# Patient Record
Sex: FEMALE | HISPANIC OR LATINO | URBAN - NONMETROPOLITAN AREA
[De-identification: names, ages, dates, MRNs, and addresses within clinical notes are randomized per-mention and may not be internally consistent; named-entity substitution may affect disease eponyms.]

---

## 2017-04-16 ENCOUNTER — OFFICE VISIT (OUTPATIENT)
Dept: URGENT CARE | Facility: PHYSICIAN GROUP | Age: 57
End: 2017-04-16
Payer: COMMERCIAL

## 2017-04-16 VITALS
TEMPERATURE: 98.7 F | DIASTOLIC BLOOD PRESSURE: 68 MMHG | SYSTOLIC BLOOD PRESSURE: 138 MMHG | RESPIRATION RATE: 20 BRPM | BODY MASS INDEX: 26.97 KG/M2 | HEART RATE: 91 BPM | WEIGHT: 167 LBS | OXYGEN SATURATION: 93 %

## 2017-04-16 DIAGNOSIS — J11.1 INFLUENZA-LIKE ILLNESS: Primary | ICD-10-CM

## 2017-04-16 DIAGNOSIS — J98.01 BRONCHOSPASM, ACUTE: ICD-10-CM

## 2017-04-16 DIAGNOSIS — R05.9 COUGH: ICD-10-CM

## 2017-04-16 LAB
FLUAV+FLUBV AG SPEC QL IA: NORMAL
INT CON NEG: NEGATIVE
INT CON POS: POSITIVE

## 2017-04-16 PROCEDURE — 87804 INFLUENZA ASSAY W/OPTIC: CPT | Performed by: PHYSICIAN ASSISTANT

## 2017-04-16 PROCEDURE — 94640 AIRWAY INHALATION TREATMENT: CPT | Performed by: PHYSICIAN ASSISTANT

## 2017-04-16 PROCEDURE — 99214 OFFICE O/P EST MOD 30 MIN: CPT | Mod: 25 | Performed by: PHYSICIAN ASSISTANT

## 2017-04-16 RX ORDER — IPRATROPIUM BROMIDE AND ALBUTEROL SULFATE 2.5; .5 MG/3ML; MG/3ML
3 SOLUTION RESPIRATORY (INHALATION) ONCE
Status: COMPLETED | OUTPATIENT
Start: 2017-04-16 | End: 2017-04-16

## 2017-04-16 RX ORDER — ALBUTEROL SULFATE 90 UG/1
2 AEROSOL, METERED RESPIRATORY (INHALATION) EVERY 4 HOURS PRN
Qty: 1 INHALER | Refills: 3 | Status: SHIPPED | OUTPATIENT
Start: 2017-04-16 | End: 2020-07-07

## 2017-04-16 RX ORDER — OSELTAMIVIR PHOSPHATE 75 MG/1
75 CAPSULE ORAL 2 TIMES DAILY
Qty: 10 CAP | Refills: 0 | Status: SHIPPED | OUTPATIENT
Start: 2017-04-16 | End: 2020-07-07

## 2017-04-16 RX ADMIN — IPRATROPIUM BROMIDE AND ALBUTEROL SULFATE 3 ML: 2.5; .5 SOLUTION RESPIRATORY (INHALATION) at 14:09

## 2017-04-16 ASSESSMENT — ENCOUNTER SYMPTOMS: BODY ACHES: 1

## 2017-04-16 NOTE — PROGRESS NOTES
Subjective:      Chichi Ellis is a 56 y.o. female who presents with Generalized Body Aches    PMH:  has a past medical history of GERD (gastroesophageal reflux disease); Diabetes; Hypertension; Hyperlipidemia; Anxiety; and Arthritis.  MEDS:   Current outpatient prescriptions:   •  sitagliptin (JANUVIA) 100 MG Tab, Take 100 mg by mouth every day., Disp: , Rfl:   •  ciprofloxacin (CIPRO) 500 MG Tab, Take 1 Tab by mouth 2 times a day., Disp: 20 Tab, Rfl: 0  •  fluconazole (DIFLUCAN) 150 MG tablet, Take 1 tab now and one in 72 hours if not resolved., Disp: 2 Tab, Rfl: 1  •  glipiZIDE (GLUCOTROL) 5 MG TABS, Take 10 mg by mouth 2 times a day., Disp: , Rfl:   •  metformin (GLUCOPHAGE) 500 MG TABS, Take 1,000 mg by mouth 2 times a day, with meals., Disp: , Rfl:   •  aspirin (ASA) 81 MG CHEW chewable tablet, Take 81 mg by mouth every day., Disp: , Rfl:   •  insulin detemir (LEVEMIR FLEXPEN) 100 UNIT/ML SOLN, 80 units bid, Disp: , Rfl:   •  insulin aspart (NOVOLOG) 100 UNIT/ML SOPN injection, Inject 5 Units as instructed 3 times a day before meals., Disp: 3 PEN, Rfl: 0  •  carvedilol (COREG) 25 MG TABS, TAKE ONE TABLET BY MOUTH TWICE DAILY WITH  MEALS, Disp: 180 Each, Rfl: 2  •  pantoprazole (PROTONIX) 40 MG TBEC, Take 1 Tab by mouth every day., Disp: 90 Each, Rfl: 3  •  sertraline (ZOLOFT) 50 MG TABS, Take 1 Tab by mouth every day. (Patient taking differently: Take 100 mg by mouth every day.), Disp: 30 Tab, Rfl: 11  ALLERGIES:   Allergies   Allergen Reactions   • Vicodin [Hydrocodone-Acetaminophen] Vomiting     SURGHX:   Past Surgical History   Procedure Laterality Date   • Abdominal hysterectomy total     • Primary c section     • Cholecystectomy       SOCHX:  reports that she has quit smoking. She does not have any smokeless tobacco history on file. She reports that she does not drink alcohol or use illicit drugs.  FH:  Reviewed with patient/family. Not pertinent to this complaint.            Generalized Body Aches  This  is a new problem. The current episode started yesterday. The problem occurs constantly. The problem has been rapidly worsening. Associated symptoms include arthralgias, chills, congestion, coughing, a fever and myalgias. Pertinent negatives include no chest pain, headaches, nausea, rash, sore throat or vomiting. The symptoms are aggravated by exertion. She has tried acetaminophen and rest (dayquil /nyquil) for the symptoms. The treatment provided no relief.       Review of Systems   Constitutional: Positive for fever and chills.   HENT: Positive for congestion. Negative for sore throat.    Eyes: Negative for discharge.   Respiratory: Positive for cough and sputum production. Negative for shortness of breath.    Cardiovascular: Negative for chest pain.   Gastrointestinal: Negative for nausea, vomiting and diarrhea.   Musculoskeletal: Positive for myalgias and arthralgias.   Skin: Negative for rash.   Neurological: Negative for dizziness and headaches.   All other systems reviewed and are negative.         Objective:     /68 mmHg  Pulse 91  Temp(Src) 37.1 °C (98.7 °F)  Resp 20  Wt 75.751 kg (167 lb)  SpO2 93%     Physical Exam   Constitutional: She is oriented to person, place, and time. She appears well-developed and well-nourished. She appears ill. No distress.   HENT:   Head: Normocephalic.   Right Ear: Tympanic membrane normal.   Left Ear: Tympanic membrane normal.   Nose: Rhinorrhea present.   Mouth/Throat: Uvula is midline and mucous membranes are normal. Posterior oropharyngeal erythema present.   Eyes: EOM are normal. Pupils are equal, round, and reactive to light.   Neck: Normal range of motion. Neck supple.   Cardiovascular: Normal rate, regular rhythm and normal heart sounds.    Pulmonary/Chest: Effort normal. No respiratory distress. She has wheezes. She has rhonchi. She has no rales.   Abdominal: Soft.   Musculoskeletal: Normal range of motion.   Neurological: She is alert and oriented to  person, place, and time.   Skin: Skin is warm and dry.               Assessment/Plan:     1. Influenza-like illness  POCT Influenza A/B    ipratropium-albuterol (DUONEB) nebulizer solution 3 mL    oseltamivir (TAMIFLU) 75 MG Cap    albuterol 108 (90 BASE) MCG/ACT Aero Soln inhalation aerosol   2. Cough  POCT Influenza A/B    ipratropium-albuterol (DUONEB) nebulizer solution 3 mL    oseltamivir (TAMIFLU) 75 MG Cap    albuterol 108 (90 BASE) MCG/ACT Aero Soln inhalation aerosol   3. Bronchospasm, acute  POCT Influenza A/B    ipratropium-albuterol (DUONEB) nebulizer solution 3 mL    oseltamivir (TAMIFLU) 75 MG Cap    albuterol 108 (90 BASE) MCG/ACT Aero Soln inhalation aerosol     Discussed that I felt this was viral in nature. Did not see any evidence of a bacterial process. Discussed natural progression and sx care.    PT can continue OTC medications, increase fluids and rest until symptoms improve.     PT should follow up with PCP in 1-2 days for re-evaluation if symptoms have not improved.  Discussed red flags and reasons to return to UC or ED.  Pt and/or family verbalized understanding of diagnosis and follow up instructions and was given informational handout on diagnosis.  PT discharged.

## 2017-04-16 NOTE — MR AVS SNAPSHOT
Chichi Ellis   2017 1:10 PM   Office Visit   MRN: [id number]    Department:  [facility]   Dept Phone:  [phone]    Description:  Female : 1960   Provider:  Zully Lee PA-C           Reason for Visit     Generalized Body Aches           Allergies as of 2017     Allergen Noted Reactions    Vicodin [Hydrocodone-Acetaminophen] 04/10/2013   Vomiting      You were diagnosed with     Influenza-like illness   [759297]  -  Primary     Cough   [786.2.ICD-9-CM]       Bronchospasm, acute   [761789]         Vital Signs     Blood Pressure Pulse Temperature Respirations Weight Oxygen Saturation    138/68 mmHg 91 37.1 °C (98.7 °F) 20 75.751 kg (167 lb) 93%    Smoking Status                   Former Smoker           Basic Information     Date Of Birth Sex Race Ethnicity Preferred Language    1960 Female  or   Origin (Thai,Sri Lankan,Palestinian,Ghanaian, etc) English      Problem List              ICD-10-CM Priority Class Noted - Resolved    HTN (hypertension) I10   2012 - Present    Anxiety F41.9   2012 - Present    DMII (diabetes mellitus, type 2) (CMS-HCC) E11.9   10/2/2012 - Present    Abdominal pain    1/3/2013 - Present    GERD (gastroesophageal reflux disease) K21.9 Medium Chronic 2013 - Present    Fatty liver K76.0 Low Chronic 2013 - Present    SOB (shortness of breath) R06.02   4/10/2013 - Present    Foot pain M79.673 Medium Chronic 2013 - Present    Chest pain R07.9   5/10/2015 - Present      Health Maintenance        Date Due Completion Dates    IMM HEP B VACCINE (1 of 3 - Primary Series) 1960 ---    DIABETES MONOFILAMENT / LE EXAM 1960 ---    RETINAL SCREENING 1978 ---    IMM DTaP/Tdap/Td Vaccine (1 - Tdap) 1979 ---    IMM PNEUMOCOCCAL 19-64 (ADULT) MEDIUM RISK SERIES (1 of 1 - PPSV23) 1979 ---    PAP SMEAR 1981 ---    MAMMOGRAM 2000 ---    COLONOSCOPY 2010 ---    A1C SCREENING 2014,  1/28/2014, 5/30/2013, 1/29/2013, 10/26/2012, 9/8/2012    URINE ACR / MICROALBUMIN 1/28/2015 1/28/2014, 9/8/2012    FASTING LIPID PROFILE 6/17/2015 6/17/2014, 1/28/2014, 5/30/2013    SERUM CREATININE 5/9/2016 5/9/2015, 6/17/2014, 1/28/2014, 5/30/2013, 10/26/2012            Results     POCT Influenza A/B      Component    Rapid Influenza A-B    neg    Internal Control Positive    Positive    Internal Control Negative    Negative                        Current Immunizations     Influenza TIV (IM) 10/12/2012      Below and/or attached are the medications your provider expects you to take. Review all of your home medications and newly ordered medications with your provider and/or pharmacist. Follow medication instructions as directed by your provider and/or pharmacist. Please keep your medication list with you and share with your provider. Update the information when medications are discontinued, doses are changed, or new medications (including over-the-counter products) are added; and carry medication information at all times in the event of emergency situations     Allergies:  VICODIN - Vomiting               Medications  Valid as of: April 16, 2017 -  2:25 PM    Generic Name Brand Name Tablet Size Instructions for use    Albuterol Sulfate (Aero Soln) albuterol 108 (90 BASE) MCG/ACT Inhale 2 Puffs by mouth every four hours as needed for Shortness of Breath (bronchospasm).        Aspirin (Chew Tab) ASA 81 MG Take 81 mg by mouth every day.        Carvedilol (Tab) COREG 25 MG TAKE ONE TABLET BY MOUTH TWICE DAILY WITH  MEALS        Ciprofloxacin HCl (Tab) CIPRO 500 MG Take 1 Tab by mouth 2 times a day.        Fluconazole (Tab) DIFLUCAN 150 MG Take 1 tab now and one in 72 hours if not resolved.        GlipiZIDE (Tab) GLUCOTROL 5 MG Take 10 mg by mouth 2 times a day.        Insulin Aspart (Solution Pen-injector) NOVOLOG 100 UNIT/ML Inject 5 Units as instructed 3 times a day before meals.        Insulin Detemir (Solution)  "LEVEMIR 100 UNIT/ML 80 units bid        MetFORMIN HCl (Tab) GLUCOPHAGE 500 MG Take 1,000 mg by mouth 2 times a day, with meals.        Oseltamivir Phosphate (Cap) TAMIFLU 75 MG Take 1 Cap by mouth 2 times a day.        Pantoprazole Sodium (Tablet Delayed Response) PROTONIX 40 MG Take 1 Tab by mouth every day.        Sertraline HCl (Tab) ZOLOFT 50 MG Take 1 Tab by mouth every day.        SITagliptin Phosphate (Tab) JANUVIA 100 MG Take 100 mg by mouth every day.        .                 Medicines prescribed today were sent to:     [address] [place] [address]    Phone: [phone] Fax: [phone]    Open 24 Hours?: No      Medication refill instructions:       If your prescription bottle indicates you have medication refills left, it is not necessary to call your provider’s office. Please contact your pharmacy and they will refill your medication.    If your prescription bottle indicates you do not have any refills left, you may request refills at any time through one of the following ways: The online [employer] system (except Urgent Care), by calling your provider’s office, or by asking your pharmacy to contact your provider’s office with a refill request. Medication refills are processed only during regular business hours and may not be available until the next business day. Your provider may request additional information or to have a follow-up visit with you prior to refilling your medication.   *Please Note: Medication refills are assigned a new Rx number when refilled electronically. Your pharmacy may indicate that no refills were authorized even though a new prescription for the same medication is available at the pharmacy. Please request the medicine by name with the pharmacy before contacting your provider for a refill.        Instructions    Influenza A (H1N1)  (Influenza A [H1N1])  La gripe H1N1, llamada \"gripe porcina\" es producida por un " "nuevo virus de influenza. El virus H1N1 es diferente del virus de la influenza estacional. Las últimas pruebas muestran que el virus H1N1 tiene genes similares a los del virus de la gripe que afecta a los cerdos en [place] y [place]. Los síntomas son similares a los de la gripe estacional y se disemina de persona a persona. Usted puede tener riesgo de sufrir mayores complicaciones si tiene kyle enfermedad crónica subyacente. El CDC y la [employer] [place]acy están controlando los casos informados en todo el manju.  CAUSAS  · La gripe se contagia a través de las gotitas que se eliminan con la tos y los estornudos.  · Kyle persona puede infectarse tocando algo que contenga el virus y luego tocándose la boca o la nariz.  SÍNTOMAS  · Fiebre.  · Dolor de cindy.  · Cansancio.  · Tos.  · Dolor de garganta.  · Congestión nasal o que gotea.  · Elif en el cuerpo.  · También pueden existir diarrea y vómitos.  Estos síntomas se conocen darling \"síntomas característicos de la gripe\". Muchas enfermedades distintas, incluso el resfrío común, pueden tener síntomas similares.  DIAGNÓSTICO  · Existen análisis que permiten diagnosticar si usted tiene el virus H1N1.  · Todos los casos confirmados serán informados al departamento de kacy estatal o local.  · Puede ser necesario que etienne médico le realice pruebas para determinar si tiene otra infección que pueda ser kyle complicación de la gripe.  INSTRUCCIONES PARA EL CUIDADO DOMICILIARIO  · Busque ayuda de inmediato si desarrolla alguno de los siguientes síntomas.  · Si está en riesgo de sufrir complicaciones de la gripe, deberá consultar a un profesional de la kacy cuando comiencen los síntomas. Las personas que tienen un alto riesgo de complicaciones son:  · Mayores de 65 años de edad.  · Pacientes con enfermedades crónicas.  · Mujeres embarazadas.  · Niños pequeños.  · El profesional que lo asiste podrá recomendar la utilización de ciertas drogas antivirales para el tratamiento " de la gripe.  · Si contrae gripe, es aconsejable que descanse lo suficiente, timothy gran cantidad de líquidos y evite el consumo de alcohol y tabaco.  · También puede peter medicamentos de venta cynthia que alivien los síntomas de la gripe, si se lo permite el profesional que lo asiste. (Nunca de aspirina a niños o adolescentes que tienen síntomas de la gripe, particularmente fiebre).  TRATAMIENTO  Si contrae la enfermedad, hay drogas antivirales disponibles. Estos medicamentos pueden hacer que etienne enfermedad sea más leve y a que mejore más rápidamente. El tratamiento debe comenzar poco después del inicio de la enfermedad. Sólo el médico puede prescribir medicamentos antivirales.  PREVENCIÓN  · Cúbrase la boca y la nariz con un tisú o con la mano cuando tosa o estornude. Descarte el tisú.  · Lave derrek lashonda frecuentemente con agua y jabón. Utilice un limpiador a base de alcohol si no dispone de agua.  · Evite tocarse los ojos, la nariz o la boca.  · Trate de evitar el contacto con personas enfermas.  · Si está enfermo, quédese en etienne casa y no concurra al trabajo o a la escuela para evitar el contagio a otras personas. Las personas infectadas con el virus H1N1 pueden infectar a otras desde un día antes de tener los síntomas hasta 5 a 7 días después.  · Hay disponible kyle vacuna que ayuda a la protección contra el virus H1N1. Además de esta vacuna, deberá vacunarse contra la gripe estacional. Estas dos vacunas deben aplicarse el mismo día. El CDC recomienda especialmente la vacuna contra el virus H1N1 en:  · Mujeres embarazadas.  · Personas que viven con niños menores de 6 meses o cuidan de ellos.  · Personal de los servicios de kacy y emergencias.  · Personas entre los 6 meses y los 24 años de edad.  · Personas entre 25 y 64 años que tienen más riesgo si contraen el virus H1N1 por que padecen enfermedades crónicas o porque tienen compromiso de etienne sistema inmunológico.  DARRYL:  En comunidades y hogares no se recomienda  el uso de mascarillas ni barbijos N95. En ciertas circunstancias, pueden utilizarlos las personas que tienen más riesgo de desarrollar olga graves de influenza. Etienne médico le podrá gaye otras tras recomendaciones para el uso de las mascarillas.  SIGNOS DE ALERTA POR TRATARSE DE KYLE EMERGENCIA QUE NECESITAN ATENCIÓN MÉDICA DE URGENCIA TANTO EN NIÑOS :  · Respiración rápida o problemas para respirar.  · Color de piel azulado.  · No guido suficiente cantidad de líquidos.  · No se despierta o no interactúa.  · Está tan irritable que no quiere que se lo cargue.  · Etienne ryan tienen kyle temperatura oral de más de 102° F (38.9° C) y no puede controlarla con medicamentos.  · Etienne bebé tiene más de 3 meses y etienne temperatura rectal es de 102° F (38.9° C) o más.  · Etienne bebé tiene 3 meses o menos y etienne temperatura rectal es de 100.4° F (38° C) o más.  · Fiebre con erupción cutánea.  SIGNOS DE ALERTA POR TRATARSE DE KYLE EMERGENCIA QUE NECESITAN ATENCIÓN MÉDICA DE URGENCIA TANTO EN ADULTOS:  · Le falta la respiración o presenta dificultades respiratorias.  · Dolor o presión en el pecho o abdomen.  · Mareos repentinos.  · Confusión.  · Vómitos intensos y persistentes.  · Está tan irritable que no quiere que se lo cargue.  · Usted kyle temperatura oral de más de 102° F (38.9° C) y no puede controlarla con medicamentos.  · Los síntomas mejoran blanca luego vuelven con fiebre y la tos empeora.  SOLICITE ATENCIÓN MÉDICA DE INMEDIATO SI OBSERVA:   Usted o alguien que conoce tiene los síntomas descritos arriba. Cuando llegue al centro de emergencias, comunique en la recepción que hayley tener gripe. Es posible que le pidan que utilice kyle máscara y/o ubicarse en un área aislada para evitar que otros se contagien.  ESTÉ SEGURO QUE:   · Comprende las instrucciones para el [place] médica.  · Controlará etienne enfermedad.  · Solicitará atención médica de inmediato según las indicaciones.  Parte de esta información es cortesía de los CDC.  Document Released:  06/05/2009 Document Revised: 03/11/2013  ExitCare® Patient Information ©2014 [employer].            [employer] Access Code: [email or web address]  Expires: 5/16/2017  2:25 PM    [employer]  A secure, online tool to manage your health information     [employer]® is a secure, online tool that connects you to your personalized health information from the privacy of your home -- day or night - making it very easy for you to manage your healthcare. Once the activation process is completed, you can even access your medical information using the [employer] miguelina, which is available for free in the [employer] Miguelina store or Google Play store.     [employer] provides the following levels of access (as shown below):   My Chart Features   [facility]  [facility]  Doctor   Email your healthcare team securely and privately 24/7 X X X    Manage appointments: schedule your next appointment; view details of past/upcoming appointments X      Request prescription refills. X      View recent personal medical records, including lab and immunizations X X X X   View health record, including health history, allergies, medications X X X X   Read reports about your outpatient visits, procedures, consult and ER notes X X X X   See your discharge summary, which is a recap of your hospital and/or ER visit that includes your diagnosis, lab results, and care plan. X X       How to register for [employer]:  1. Go to  [email or web address].  2. Click on the Sign Up Now box, which takes you to the New Member Sign Up page. You will need to provide the following information:  a. Enter your [employer] Access Code exactly as it appears at the top of this page. (You will not need to use this code after you’ve completed the sign-up process. If you do not sign up before the expiration date, you must request a new code.)   b. Enter your date of birth.   c. Enter your home email address.   d. Click  Submit, and follow the next screen’s instructions.  3. Create a [employer] ID. This will be your [employer] login ID and cannot be changed, so think of one that is secure and easy to remember.  4. Create a [employer] password. You can change your password at any time.  5. Enter your Password Reset Question and Answer. This can be used at a later time if you forget your password.   6. Enter your e-mail address. This allows you to receive e-mail notifications when new information is available in [employer].  7. Click Sign Up. You can now view your health information.    For assistance activating your [employer] account, call [phone]

## 2017-04-16 NOTE — PATIENT INSTRUCTIONS
"Influenza A (H1N1)  (Influenza A [H1N1])  La gripe H1N1, llamada \"gripe porcina\" es producida por un nuevo virus de influenza. El virus H1N1 es diferente del virus de la influenza estacional. Las últimas pruebas muestran que el virus H1N1 tiene genes similares a los del virus de la gripe que afecta a los cerdos en [place] y [place]. Los síntomas son similares a los de la gripe estacional y se disemina de persona a persona. Usted puede tener riesgo de sufrir mayores complicaciones si tiene kyle enfermedad crónica subyacente. El CDC y la [employer] [place] están controlando los casos informados en todo el manju.  CAUSAS  · La gripe se contagia a través de las gotitas que se eliminan con la tos y los estornudos.  · Kyle persona puede infectarse tocando algo que contenga el virus y luego tocándose la boca o la nariz.  SÍNTOMAS  · Fiebre.  · Dolor de cindy.  · Cansancio.  · Tos.  · Dolor de garganta.  · Congestión nasal o que gotea.  · Elif en el cuerpo.  · También pueden existir diarrea y vómitos.  Estos síntomas se conocen darling \"síntomas característicos de la gripe\". Muchas enfermedades distintas, incluso el resfrío común, pueden tener síntomas similares.  DIAGNÓSTICO  · Existen análisis que permiten diagnosticar si usted tiene el virus H1N1.  · Todos los casos confirmados serán informados al departamento de kacy estatal o local.  · Puede ser necesario que etienne médico le realice pruebas para determinar si tiene otra infección que pueda ser kyle complicación de la gripe.  INSTRUCCIONES PARA EL CUIDADO DOMICILIARIO  · Busque ayuda de inmediato si desarrolla alguno de los siguientes síntomas.  · Si está en riesgo de sufrir complicaciones de la gripe, deberá consultar a un profesional de la kacy cuando comiencen los síntomas. Las personas que tienen un alto riesgo de complicaciones son:  · Mayores de 65 años de edad.  · Pacientes con enfermedades crónicas.  · Mujeres embarazadas.  · Niños pequeños.  · El " profesional que lo asiste podrá recomendar la utilización de ciertas drogas antivirales para el tratamiento de la gripe.  · Si contrae gripe, es aconsejable que descanse lo suficiente, timothy gran cantidad de líquidos y evite el consumo de alcohol y tabaco.  · También puede peter medicamentos de venta cynthia que alivien los síntomas de la gripe, si se lo permite el profesional que lo asiste. (Nunca de aspirina a niños o adolescentes que tienen síntomas de la gripe, particularmente fiebre).  TRATAMIENTO  Si contrae la enfermedad, hay drogas antivirales disponibles. Estos medicamentos pueden hacer que etienne enfermedad sea más leve y a que mejore más rápidamente. El tratamiento debe comenzar poco después del inicio de la enfermedad. Sólo el médico puede prescribir medicamentos antivirales.  PREVENCIÓN  · Cúbrase la boca y la nariz con un tisú o con la mano cuando tosa o estornude. Descarte el tisú.  · Lave derrek lashonda frecuentemente con agua y jabón. Utilice un limpiador a base de alcohol si no dispone de agua.  · Evite tocarse los ojos, la nariz o la boca.  · Trate de evitar el contacto con personas enfermas.  · Si está enfermo, quédese en etienne casa y no concurra al trabajo o a la escuela para evitar el contagio a otras personas. Las personas infectadas con el virus H1N1 pueden infectar a otras desde un día antes de tener los síntomas hasta 5 a 7 días después.  · Hay disponible kyle vacuna que ayuda a la protección contra el virus H1N1. Además de esta vacuna, deberá vacunarse contra la gripe estacional. Estas dos vacunas deben aplicarse el mismo día. El CDC recomienda especialmente la vacuna contra el virus H1N1 en:  · Mujeres embarazadas.  · Personas que viven con niños menores de 6 meses o cuidan de ellos.  · Personal de los servicios de kacy y emergencias.  · Personas entre los 6 meses y los 24 años de edad.  · Personas entre 25 y 64 años que tienen más riesgo si contraen el virus H1N1 por que padecen enfermedades crónicas o  porque tienen compromiso de etienne sistema inmunológico.  BARBIJOS:  En comunidades y hogares no se recomienda el uso de mascarillas ni barbijos N95. En ciertas circunstancias, pueden utilizarlos las personas que tienen más riesgo de desarrollar olga graves de influenza. Etienne médico le podrá gaye otras tras recomendaciones para el uso de las mascarillas.  SIGNOS DE ALERTA POR TRATARSE DE KYLE EMERGENCIA QUE NECESITAN ATENCIÓN MÉDICA DE URGENCIA TANTO EN NIÑOS :  · Respiración rápida o problemas para respirar.  · Color de piel azulado.  · No guido suficiente cantidad de líquidos.  · No se despierta o no interactúa.  · Está tan irritable que no quiere que se lo cargue.  · Etienne ryan tienen kyle temperatura oral de más de 102° F (38.9° C) y no puede controlarla con medicamentos.  · Etienne bebé tiene más de 3 meses y etienne temperatura rectal es de 102° F (38.9° C) o más.  · Etienne bebé tiene 3 meses o menos y etienne temperatura rectal es de 100.4° F (38° C) o más.  · Fiebre con erupción cutánea.  SIGNOS DE ALERTA POR TRATARSE DE KYLE EMERGENCIA QUE NECESITAN ATENCIÓN MÉDICA DE URGENCIA TANTO EN ADULTOS:  · Le falta la respiración o presenta dificultades respiratorias.  · Dolor o presión en el pecho o abdomen.  · Mareos repentinos.  · Confusión.  · Vómitos intensos y persistentes.  · Está tan irritable que no quiere que se lo cargue.  · Usted kyle temperatura oral de más de 102° F (38.9° C) y no puede controlarla con medicamentos.  · Los síntomas mejoran blanca luego vuelven con fiebre y la tos empeora.  SOLICITE ATENCIÓN MÉDICA DE INMEDIATO SI OBSERVA:   Usted o alguien que conoce tiene los síntomas descritos arriba. Cuando llegue al centro de emergencias, comunique en la recepción que hayley tener gripe. Es posible que le pidan que utilice kyle máscara y/o ubicarse en un área aislada para evitar que otros se contagien.  ESTÉ SEGURO QUE:   · Comprende las instrucciones para el [place] médica.  · Controlará etienne enfermedad.  · Solicitará atención médica de  inmediato según las indicaciones.  Parte de esta información es cortesía de los CDC.  Document Released: 06/05/2009 Document Revised: 03/11/2013  [employer]® Patient Information ©2014 [employer].

## 2017-04-19 ASSESSMENT — ENCOUNTER SYMPTOMS
EYE DISCHARGE: 0
MYALGIAS: 1
SPUTUM PRODUCTION: 1
SORE THROAT: 0
DIARRHEA: 0
SHORTNESS OF BREATH: 0
NAUSEA: 0
DIZZINESS: 0
CHILLS: 1
VOMITING: 0
HEADACHES: 0
ARTHRALGIAS: 1
COUGH: 1
FEVER: 1

## 2018-07-06 ENCOUNTER — OFFICE VISIT (OUTPATIENT)
Dept: URGENT CARE | Facility: PHYSICIAN GROUP | Age: 58
End: 2018-07-06
Payer: COMMERCIAL

## 2018-07-06 VITALS
HEIGHT: 66 IN | WEIGHT: 167 LBS | SYSTOLIC BLOOD PRESSURE: 130 MMHG | HEART RATE: 86 BPM | TEMPERATURE: 98.8 F | RESPIRATION RATE: 16 BRPM | OXYGEN SATURATION: 97 % | DIASTOLIC BLOOD PRESSURE: 82 MMHG | BODY MASS INDEX: 26.84 KG/M2

## 2018-07-06 DIAGNOSIS — T07.XXXA MULTIPLE CONTUSIONS: ICD-10-CM

## 2018-07-06 PROCEDURE — 99214 OFFICE O/P EST MOD 30 MIN: CPT | Performed by: PHYSICIAN ASSISTANT

## 2018-07-06 RX ORDER — DICLOFENAC SODIUM 75 MG/1
75 TABLET, DELAYED RELEASE ORAL 2 TIMES DAILY
Qty: 30 TAB | Refills: 0 | Status: SHIPPED | OUTPATIENT
Start: 2018-07-06 | End: 2020-07-07

## 2018-07-06 NOTE — PROGRESS NOTES
Chief Complaint   Patient presents with   • Fall     L arm and leg pain       HISTORY OF PRESENT ILLNESS: Patient is a 58 y.o. female who presents today because she fell about an hour ago causing pain to her left arm, wrist, left hip and leg.  She has not been taking any medications for her symptoms.  She has not been icing the area.  She did not lose consciousness.  She is able to walk and ambulate and move her upper and lower extremities but it is painful    Patient Active Problem List    Diagnosis Date Noted   • Foot pain 05/30/2013     Priority: Medium     Class: Chronic   • GERD (gastroesophageal reflux disease) 01/29/2013     Priority: Medium     Class: Chronic   • Fatty liver 01/29/2013     Priority: Low     Class: Chronic   • Chest pain 05/10/2015   • SOB (shortness of breath) 04/10/2013   • Abdominal pain 01/03/2013   • DMII (diabetes mellitus, type 2) (HCC) 10/02/2012   • HTN (hypertension) 09/05/2012   • Anxiety 09/05/2012       Allergies:Vicodin [hydrocodone-acetaminophen]    Current Outpatient Prescriptions Ordered in [facility]   Medication Sig Dispense Refill   • diclofenac EC (VOLTAREN) 75 MG Tablet Delayed Response Take 1 Tab by mouth 2 times a day. 30 Tab 0   • oseltamivir (TAMIFLU) 75 MG Cap Take 1 Cap by mouth 2 times a day. 10 Cap 0   • albuterol 108 (90 BASE) MCG/ACT Aero Soln inhalation aerosol Inhale 2 Puffs by mouth every four hours as needed for Shortness of Breath (bronchospasm). 1 Inhaler 3   • sitagliptin (JANUVIA) 100 MG Tab Take 100 mg by mouth every day.     • ciprofloxacin (CIPRO) 500 MG Tab Take 1 Tab by mouth 2 times a day. 20 Tab 0   • fluconazole (DIFLUCAN) 150 MG tablet Take 1 tab now and one in 72 hours if not resolved. 2 Tab 1   • glipiZIDE (GLUCOTROL) 5 MG TABS Take 10 mg by mouth 2 times a day.     • metformin (GLUCOPHAGE) 500 MG TABS Take 1,000 mg by mouth 2 times a day, with meals.     • aspirin (ASA) 81 MG CHEW chewable tablet Take 81 mg by mouth every day.     • insulin  "detemir (LEVEMIR FLEXPEN) 100 UNIT/ML SOLN 80 units bid     • insulin aspart (NOVOLOG) 100 UNIT/ML SOPN injection Inject 5 Units as instructed 3 times a day before meals. 3 PEN 0   • carvedilol (COREG) 25 MG TABS TAKE ONE TABLET BY MOUTH TWICE DAILY WITH  MEALS 180 Each 2   • pantoprazole (PROTONIX) 40 MG TBEC Take 1 Tab by mouth every day. 90 Each 3   • sertraline (ZOLOFT) 50 MG TABS Take 1 Tab by mouth every day. (Patient taking differently: Take 100 mg by mouth every day.) 30 Tab 11     No current Epic-ordered facility-administered medications on file.        Past Medical History:   Diagnosis Date   • Anxiety    • Arthritis    • Diabetes    • GERD (gastroesophageal reflux disease)    • Hyperlipidemia    • Hypertension        Social History   Substance Use Topics   • Smoking status: Former Smoker     Packs/day: 2.00     Years: 30.00   • Smokeless tobacco: Not on file   • Alcohol use No       Family Status   Relation Status   • Mother Other   • Father Other   No family history on file.    ROS:  Review of Systems   Constitutional: Negative for fever, chills, weight loss and malaise/fatigue.   HENT: Negative for ear pain, nosebleeds, congestion, sore throat and neck pain.    Eyes: Negative for blurred vision.   Respiratory: Negative for cough, sputum production, shortness of breath and wheezing.    Cardiovascular: Negative for chest pain, palpitations, orthopnea and leg swelling.   Gastrointestinal: Negative for heartburn, nausea, vomiting and abdominal pain.   Genitourinary: Negative for dysuria, urgency and frequency.     Exam:  Blood pressure 130/82, pulse 86, temperature 37.1 °C (98.8 °F), resp. rate 16, height 1.676 m (5' 6\"), weight 75.8 kg (167 lb), SpO2 97 %.  General:  Well nourished, well developed female in NAD  Head:Normocephalic, atraumatic  Eyes: PERRLA, EOM within normal limits, no conjunctival injection, no scleral icterus, visual fields and acuity grossly intact.  Extremities: no clubbing, cyanosis, " or edema.  Upper and lower extremities are without any visual deformity, erythema, edema or ecchymosis.  She has somewhat reduced range of motion with her left shoulder secondary to pain.  She has good distal  strength, circulation and sensation.  Left leg has good range of motion in all planes, is able to ambulate without any  limp.    Please note that this dictation was created using voice recognition software. I have made every reasonable attempt to correct obvious errors, but I expect that there are errors of grammar and possibly content that I did not discover before finalizing the note.    Assessment/Plan:  1. Multiple contusions  diclofenac EC (VOLTAREN) 75 MG Tablet Delayed Response   Apply ice to the areas.    Followup with primary care in the next 7-10 days if not significantly improving, return to the urgent care or go to the emergency room sooner for any worsening of symptoms.

## 2020-01-18 ENCOUNTER — OFFICE VISIT (OUTPATIENT)
Dept: URGENT CARE | Facility: PHYSICIAN GROUP | Age: 60
End: 2020-01-18
Payer: COMMERCIAL

## 2020-01-18 ENCOUNTER — HOSPITAL ENCOUNTER (OUTPATIENT)
Facility: MEDICAL CENTER | Age: 60
End: 2020-01-18
Attending: PHYSICIAN ASSISTANT
Payer: COMMERCIAL

## 2020-01-18 VITALS
RESPIRATION RATE: 16 BRPM | OXYGEN SATURATION: 97 % | WEIGHT: 156 LBS | TEMPERATURE: 98.3 F | DIASTOLIC BLOOD PRESSURE: 76 MMHG | HEART RATE: 80 BPM | BODY MASS INDEX: 25.07 KG/M2 | SYSTOLIC BLOOD PRESSURE: 142 MMHG | HEIGHT: 66 IN

## 2020-01-18 DIAGNOSIS — N30.00 ACUTE CYSTITIS WITHOUT HEMATURIA: ICD-10-CM

## 2020-01-18 LAB
APPEARANCE UR: CLEAR
BILIRUB UR STRIP-MCNC: NORMAL MG/DL
COLOR UR AUTO: YELLOW
GLUCOSE UR STRIP.AUTO-MCNC: 100 MG/DL
KETONES UR STRIP.AUTO-MCNC: NORMAL MG/DL
LEUKOCYTE ESTERASE UR QL STRIP.AUTO: NORMAL
NITRITE UR QL STRIP.AUTO: NORMAL
PH UR STRIP.AUTO: 6 [PH] (ref 5–8)
PROT UR QL STRIP: NORMAL MG/DL
RBC UR QL AUTO: NORMAL
SP GR UR STRIP.AUTO: 1.02
UROBILINOGEN UR STRIP-MCNC: 1 MG/DL

## 2020-01-18 PROCEDURE — 81002 URINALYSIS NONAUTO W/O SCOPE: CPT | Performed by: PHYSICIAN ASSISTANT

## 2020-01-18 PROCEDURE — 87086 URINE CULTURE/COLONY COUNT: CPT

## 2020-01-18 PROCEDURE — 99214 OFFICE O/P EST MOD 30 MIN: CPT | Performed by: PHYSICIAN ASSISTANT

## 2020-01-18 RX ORDER — NITROFURANTOIN 25; 75 MG/1; MG/1
100 CAPSULE ORAL EVERY 12 HOURS
Qty: 10 CAP | Refills: 0 | Status: SHIPPED | OUTPATIENT
Start: 2020-01-18 | End: 2020-01-23

## 2020-01-18 RX ORDER — TRAZODONE HYDROCHLORIDE 100 MG/1
100 TABLET ORAL NIGHTLY
COMMUNITY

## 2020-01-18 RX ORDER — ROSUVASTATIN CALCIUM 20 MG/1
20 TABLET, COATED ORAL EVERY EVENING
COMMUNITY

## 2020-01-18 ASSESSMENT — ENCOUNTER SYMPTOMS
FEVER: 0
SHORTNESS OF BREATH: 1
ABDOMINAL PAIN: 1
FLANK PAIN: 1
BACK PAIN: 1
SWEATS: 0
COUGH: 0
HEADACHES: 0
VOMITING: 0
CHILLS: 1
PALPITATIONS: 0
NAUSEA: 1

## 2020-01-18 NOTE — PROGRESS NOTES
Subjective:      Chichi Ellis is a 59 y.o. female who presents with UTI            Dysuria    This is a new problem. Episode onset: 2 days. The problem occurs every urination. The problem has been gradually worsening. The quality of the pain is described as burning. The pain is moderate. There has been no fever. There is no history of pyelonephritis. Associated symptoms include chills, flank pain (bilateral. L>R), frequency, nausea and urgency. Pertinent negatives include no discharge, hematuria, possible pregnancy, sweats or vomiting. She has tried increased fluids for the symptoms. The treatment provided no relief. Her past medical history is significant for recurrent UTIs. There is no history of kidney stones.       Past Medical History:   Diagnosis Date   • Anxiety    • Arthritis    • Diabetes    • GERD (gastroesophageal reflux disease)    • Hyperlipidemia    • Hypertension        Past Surgical History:   Procedure Laterality Date   • ABDOMINAL HYSTERECTOMY TOTAL     • CHOLECYSTECTOMY     • PRIMARY C SECTION         History reviewed. No pertinent family history.    Allergies   Allergen Reactions   • Vicodin [Hydrocodone-Acetaminophen] Vomiting       Medications, Allergies, and current problem list reviewed today in Epic    Review of Systems   Constitutional: Positive for chills. Negative for fever and malaise/fatigue.   Respiratory: Positive for shortness of breath. Negative for cough.    Cardiovascular: Negative for chest pain, palpitations and leg swelling.   Gastrointestinal: Positive for abdominal pain (lower abdominal pressure ) and nausea. Negative for vomiting.   Genitourinary: Positive for dysuria, flank pain (bilateral. L>R), frequency and urgency. Negative for hematuria.   Musculoskeletal: Positive for back pain.   Skin: Negative for rash.   Neurological: Negative for headaches.     All other systems reviewed and are negative.        Objective:     /76 (BP Location: Left arm, Patient Position:  "Sitting, BP Cuff Size: Adult)   Pulse 80   Temp 36.8 °C (98.3 °F) (Temporal)   Resp 16   Ht 1.676 m (5' 6\")   Wt 70.8 kg (156 lb)   SpO2 97%   BMI 25.18 kg/m²      Physical Exam  Constitutional:       General: She is not in acute distress.  HENT:      Head: Normocephalic and atraumatic.   Cardiovascular:      Rate and Rhythm: Normal rate and regular rhythm.      Heart sounds: Normal heart sounds. No murmur. No friction rub. No gallop.    Pulmonary:      Effort: Pulmonary effort is normal. No respiratory distress.      Breath sounds: No wheezing, rhonchi or rales.   Abdominal:      General: There is no distension.      Palpations: Abdomen is soft. There is no mass.      Tenderness: There is tenderness (mild TTP in suprapubic area). There is right CVA tenderness and left CVA tenderness. There is no guarding or rebound.   Skin:     General: Skin is warm and dry.      Findings: No rash.   Neurological:      Mental Status: She is alert.   Psychiatric:         Mood and Affect: Mood normal.         Behavior: Behavior normal.         Thought Content: Thought content normal.         Judgment: Judgment normal.          UA: positive glucose       Assessment/Plan:       1. Acute cystitis without hematuria  POCT Urinalysis    Urine Culture    nitrofurantoin monohyd macro (MACROBID) 100 MG Cap       Advised ED if worsening pain, fevers, or vomiting.     Differential diagnoses, Supportive care, and indications for immediate follow-up discussed with patient.   Instructed to return to clinic or nearest emergency department for any change in condition, further concerns, or worsening of symptoms.    The patient demonstrated a good understanding and agreed with the treatment plan.    Chana Alvarenga P.A.-C.      "

## 2020-01-20 LAB
BACTERIA UR CULT: NORMAL
SIGNIFICANT IND 70042: NORMAL
SITE SITE: NORMAL
SOURCE SOURCE: NORMAL

## 2020-07-04 ENCOUNTER — OFFICE VISIT (OUTPATIENT)
Dept: URGENT CARE | Facility: PHYSICIAN GROUP | Age: 60
End: 2020-07-04
Payer: COMMERCIAL

## 2020-07-04 ENCOUNTER — HOSPITAL ENCOUNTER (OUTPATIENT)
Facility: MEDICAL CENTER | Age: 60
End: 2020-07-04
Attending: PHYSICIAN ASSISTANT
Payer: COMMERCIAL

## 2020-07-04 VITALS
HEART RATE: 80 BPM | RESPIRATION RATE: 20 BRPM | BODY MASS INDEX: 25.18 KG/M2 | OXYGEN SATURATION: 97 % | HEIGHT: 66 IN | SYSTOLIC BLOOD PRESSURE: 128 MMHG | DIASTOLIC BLOOD PRESSURE: 76 MMHG | TEMPERATURE: 98.5 F

## 2020-07-04 DIAGNOSIS — T14.8XXA MUSCLE STRAIN: ICD-10-CM

## 2020-07-04 DIAGNOSIS — R10.9 FLANK PAIN: ICD-10-CM

## 2020-07-04 LAB
APPEARANCE UR: CLEAR
BILIRUB UR STRIP-MCNC: NORMAL MG/DL
COLOR UR AUTO: YELLOW
GLUCOSE UR STRIP.AUTO-MCNC: NORMAL MG/DL
KETONES UR STRIP.AUTO-MCNC: NORMAL MG/DL
LEUKOCYTE ESTERASE UR QL STRIP.AUTO: NORMAL
NITRITE UR QL STRIP.AUTO: NORMAL
PH UR STRIP.AUTO: 6.5 [PH] (ref 5–8)
PROT UR QL STRIP: NORMAL MG/DL
RBC UR QL AUTO: NORMAL
SP GR UR STRIP.AUTO: 1.01
UROBILINOGEN UR STRIP-MCNC: 1 MG/DL

## 2020-07-04 PROCEDURE — 81002 URINALYSIS NONAUTO W/O SCOPE: CPT | Performed by: PHYSICIAN ASSISTANT

## 2020-07-04 PROCEDURE — 99214 OFFICE O/P EST MOD 30 MIN: CPT | Mod: 25 | Performed by: PHYSICIAN ASSISTANT

## 2020-07-04 PROCEDURE — 87086 URINE CULTURE/COLONY COUNT: CPT

## 2020-07-04 RX ORDER — KETOROLAC TROMETHAMINE 30 MG/ML
30 INJECTION, SOLUTION INTRAMUSCULAR; INTRAVENOUS ONCE
Status: COMPLETED | OUTPATIENT
Start: 2020-07-04 | End: 2020-07-04

## 2020-07-04 RX ORDER — CYCLOBENZAPRINE HCL 10 MG
10 TABLET ORAL 3 TIMES DAILY PRN
Qty: 30 TAB | Refills: 0 | Status: SHIPPED | OUTPATIENT
Start: 2020-07-04 | End: 2023-12-13

## 2020-07-04 RX ADMIN — KETOROLAC TROMETHAMINE 30 MG: 30 INJECTION, SOLUTION INTRAMUSCULAR; INTRAVENOUS at 09:45

## 2020-07-04 NOTE — PROGRESS NOTES
Chief Complaint   Patient presents with   • UTI       HISTORY OF PRESENT ILLNESS: Patient is a 60 y.o. female who presents today because he has a 2 to 3-day history of left-sided flank and muscle pain.  She is not sure if it is a kidney infection or a muscle strain.  This started after helping someone up the stairs 3 days ago.  She has not been taking any medications for her current symptoms and denies any urinary symptoms.    Patient Active Problem List    Diagnosis Date Noted   • Foot pain 05/30/2013     Priority: Medium     Class: Chronic   • GERD (gastroesophageal reflux disease) 01/29/2013     Priority: Medium     Class: Chronic   • Fatty liver 01/29/2013     Priority: Low     Class: Chronic   • Chest pain 05/10/2015   • SOB (shortness of breath) 04/10/2013   • Abdominal pain 01/03/2013   • DMII (diabetes mellitus, type 2) (HCC) 10/02/2012   • HTN (hypertension) 09/05/2012   • Anxiety 09/05/2012       Allergies:Vicodin [hydrocodone-acetaminophen]    Current Outpatient Medications Ordered in Epic   Medication Sig Dispense Refill   • cyclobenzaprine (FLEXERIL) 10 MG Tab Take 1 Tab by mouth 3 times a day as needed. 30 Tab 0   • Exenatide ER (BYDUREON) 2 MG Pen-injector Inject  as instructed.     • rosuvastatin (CRESTOR) 20 MG Tab Take 20 mg by mouth every evening.     • traZODone (DESYREL) 100 MG Tab Take 100 mg by mouth every evening.     • Insulin Degludec (TRESIBA SC) Inject  as instructed.     • diclofenac EC (VOLTAREN) 75 MG Tablet Delayed Response Take 1 Tab by mouth 2 times a day. 30 Tab 0   • sitagliptin (JANUVIA) 100 MG Tab Take 100 mg by mouth every day.     • glipiZIDE (GLUCOTROL) 5 MG TABS Take 10 mg by mouth 2 times a day.     • metformin (GLUCOPHAGE) 500 MG TABS Take 1,000 mg by mouth 2 times a day, with meals.     • aspirin (ASA) 81 MG CHEW chewable tablet Take 81 mg by mouth every day.     • insulin detemir (LEVEMIR FLEXPEN) 100 UNIT/ML SOLN 80 units bid     • insulin aspart (NOVOLOG) 100 UNIT/ML  SOPN injection Inject 5 Units as instructed 3 times a day before meals. 3 PEN 0   • carvedilol (COREG) 25 MG TABS TAKE ONE TABLET BY MOUTH TWICE DAILY WITH  MEALS 180 Each 2   • pantoprazole (PROTONIX) 40 MG TBEC Take 1 Tab by mouth every day. 90 Each 3   • sertraline (ZOLOFT) 50 MG TABS Take 1 Tab by mouth every day. (Patient taking differently: Take 100 mg by mouth every day.) 30 Tab 11   • oseltamivir (TAMIFLU) 75 MG Cap Take 1 Cap by mouth 2 times a day. 10 Cap 0   • albuterol 108 (90 BASE) MCG/ACT Aero Soln inhalation aerosol Inhale 2 Puffs by mouth every four hours as needed for Shortness of Breath (bronchospasm). (Patient not taking: Reported on 1/18/2020) 1 Inhaler 3   • ciprofloxacin (CIPRO) 500 MG Tab Take 1 Tab by mouth 2 times a day. 20 Tab 0   • fluconazole (DIFLUCAN) 150 MG tablet Take 1 tab now and one in 72 hours if not resolved. 2 Tab 1     Current Facility-Administered Medications Ordered in Epic   Medication Dose Route Frequency Provider Last Rate Last Dose   • ketorolac (TORADOL) injection 30 mg  30 mg Intramuscular Once Les Benito P.A.-C.           Past Medical History:   Diagnosis Date   • Anxiety    • Arthritis    • Diabetes    • GERD (gastroesophageal reflux disease)    • Hyperlipidemia    • Hypertension        Social History     Tobacco Use   • Smoking status: Former Smoker     Packs/day: 2.00     Years: 30.00     Pack years: 60.00   • Smokeless tobacco: Never Used   Substance Use Topics   • Alcohol use: No   • Drug use: No       Family Status   Relation Name Status   • Mo  Other   • Fa  Other   History reviewed. No pertinent family history.    ROS:  Review of Systems   Constitutional: Negative for fever, chills, weight loss and malaise/fatigue.   HENT: Negative for ear pain, nosebleeds, congestion, sore throat and neck pain.    Eyes: Negative for blurred vision.   Respiratory: Negative for cough, sputum production, shortness of breath and wheezing.    Cardiovascular: Negative for  "chest pain, palpitations, orthopnea and leg swelling.   Gastrointestinal: Negative for heartburn, nausea, vomiting and abdominal pain.   Genitourinary: Negative for dysuria, urgency and frequency.     Exam:  /76 (BP Location: Right arm, Patient Position: Sitting, BP Cuff Size: Adult)   Pulse 80   Temp 36.9 °C (98.5 °F) (Temporal)   Resp 20   Ht 1.676 m (5' 6\")   SpO2 97%   General:  Well nourished, well developed female in NAD  Head:Normocephalic, atraumatic  Eyes: PERRLA, EOM within normal limits, no conjunctival injection, no scleral icterus, visual fields and acuity grossly intact.  Nose: Symmetrical without tenderness, no discharge.  Mouth: reasonable hygiene, no erythema exudates or tonsillar enlargement.  Neck: no masses, range of motion within normal limits, no tracheal deviation. No obvious thyroid enlargement.  Extremities: no clubbing, cyanosis, or edema.  Musculoskeletal: She has tenderness to palpation of the paraspinous musculature and tissues of her lower thoracic and upper lumbar area on the left, no vertebral point tenderness.    Analysis has trace leuks    Please note that this dictation was created using voice recognition software. I have made every reasonable attempt to correct obvious errors, but I expect that there are errors of grammar and possibly content that I did not discover before finalizing the note.    Assessment/Plan:  1. Flank pain  POCT Urinalysis    Urine Culture   2. Muscle strain  ketorolac (TORADOL) injection 30 mg    cyclobenzaprine (FLEXERIL) 10 MG Tab   Plan heat to the area    Followup with primary care in the next 7-10 days if not significantly improving, return to the urgent care or go to the emergency room sooner for any worsening of symptoms.       "

## 2020-07-07 ENCOUNTER — OFFICE VISIT (OUTPATIENT)
Dept: URGENT CARE | Facility: PHYSICIAN GROUP | Age: 60
End: 2020-07-07
Payer: COMMERCIAL

## 2020-07-07 VITALS
HEIGHT: 66 IN | DIASTOLIC BLOOD PRESSURE: 90 MMHG | TEMPERATURE: 97.9 F | HEART RATE: 96 BPM | OXYGEN SATURATION: 97 % | BODY MASS INDEX: 28.28 KG/M2 | WEIGHT: 176 LBS | SYSTOLIC BLOOD PRESSURE: 138 MMHG

## 2020-07-07 DIAGNOSIS — M54.16 LUMBAR RADICULOPATHY: ICD-10-CM

## 2020-07-07 DIAGNOSIS — R10.9 LEFT FLANK PAIN: ICD-10-CM

## 2020-07-07 LAB
BACTERIA UR CULT: NORMAL
SIGNIFICANT IND 70042: NORMAL
SITE SITE: NORMAL
SOURCE SOURCE: NORMAL

## 2020-07-07 PROCEDURE — 99214 OFFICE O/P EST MOD 30 MIN: CPT | Performed by: PHYSICIAN ASSISTANT

## 2020-07-07 RX ORDER — GABAPENTIN 100 MG/1
100 CAPSULE ORAL 3 TIMES DAILY PRN
Qty: 30 CAP | Refills: 0 | Status: SHIPPED | OUTPATIENT
Start: 2020-07-07 | End: 2023-12-13

## 2020-07-07 RX ORDER — TIZANIDINE 4 MG/1
4 TABLET ORAL EVERY 6 HOURS PRN
Qty: 30 TAB | Refills: 0 | Status: SHIPPED | OUTPATIENT
Start: 2020-07-07 | End: 2023-02-27

## 2020-07-07 NOTE — PROGRESS NOTES
"Chief Complaint   Patient presents with   • Back Pain     lower back pain was seen on friday / medication is not working       HISTORY OF PRESENT ILLNESS: Patient is a 60 y.o. female who presents today for the following:    Patient complains of left-sided flank pain and muscle pain that started about a week ago.  She was seen 7/4 for the same and given Toradol and cyclobenzaprine without any change in symptoms.  Urinalysis was unremarkable on the initial evaluation. CURRENTLY: No change in pain, including no improvement with cyclobenzaprine.  Reports radiating \"heat\" down the lateral aspect of the left hip into the anterior left upper and lower leg, and around the left lower quadrant of the abdomen.  Denies overt abdominal pain.  Denies saddle anesthesia, bowel/bladder incontinence.    Patient Active Problem List    Diagnosis Date Noted   • Foot pain 05/30/2013     Priority: Medium     Class: Chronic   • GERD (gastroesophageal reflux disease) 01/29/2013     Priority: Medium     Class: Chronic   • Fatty liver 01/29/2013     Priority: Low     Class: Chronic   • Chest pain 05/10/2015   • SOB (shortness of breath) 04/10/2013   • Abdominal pain 01/03/2013   • DMII (diabetes mellitus, type 2) (HCC) 10/02/2012   • HTN (hypertension) 09/05/2012   • Anxiety 09/05/2012       Allergies:Vicodin [hydrocodone-acetaminophen]    Current Outpatient Medications Ordered in Epic   Medication Sig Dispense Refill   • gabapentin (NEURONTIN) 100 MG Cap Take 1 Cap by mouth 3 times a day as needed (back pain). START with one pill at bedtime, increase to 3 times daily as needed/as tolerated 30 Cap 0   • tizanidine (ZANAFLEX) 4 MG Tab Take 1 Tab by mouth every 6 hours as needed (pain). 30 Tab 0   • cyclobenzaprine (FLEXERIL) 10 MG Tab Take 1 Tab by mouth 3 times a day as needed. 30 Tab 0   • rosuvastatin (CRESTOR) 20 MG Tab Take 20 mg by mouth every evening.     • traZODone (DESYREL) 100 MG Tab Take 100 mg by mouth every evening.     • Insulin " "Degludec (TRESIBA SC) Inject  as instructed.     • metformin (GLUCOPHAGE) 500 MG TABS Take 1,000 mg by mouth 2 times a day, with meals.     • aspirin (ASA) 81 MG CHEW chewable tablet Take 81 mg by mouth every day.     • insulin detemir (LEVEMIR FLEXPEN) 100 UNIT/ML SOLN 80 units bid     • carvedilol (COREG) 25 MG TABS TAKE ONE TABLET BY MOUTH TWICE DAILY WITH  MEALS 180 Each 2   • sertraline (ZOLOFT) 50 MG TABS Take 1 Tab by mouth every day. (Patient taking differently: Take 100 mg by mouth every day.) 30 Tab 11     No current [facility]-ordered facility-administered medications on file.        Past Medical History:   Diagnosis Date   • Anxiety    • Arthritis    • Diabetes    • GERD (gastroesophageal reflux disease)    • Hyperlipidemia    • Hypertension        Social History     Tobacco Use   • Smoking status: Former Smoker     Packs/day: 2.00     Years: 30.00     Pack years: 60.00   • Smokeless tobacco: Never Used   Substance Use Topics   • Alcohol use: No   • Drug use: No       Family Status   Relation Name Status   • Mo  Other   • Fa  Other   History reviewed. No pertinent family history.    Review of Systems:   Constitutional ROS: No unexpected change in weight, No weakness, No fatigue  Pulmonary ROS: No chronic cough, sputum, or hemoptysis, No dyspnea on exertion, No wheezing  Cardiovascular ROS: No diaphoresis, No edema, No palpitations  Musculoskeletal/Extremities ROS: Left flank pain  Hematologic/Lymphatic ROS: No chills, No night sweats, No weight loss  Skin/Integumentary ROS: No edema, No evidence of rash, No itching      Exam:  /90   Pulse 96   Temp 36.6 °C (97.9 °F) (Temporal)   Ht 1.676 m (5' 6\")   Wt 79.8 kg (176 lb)   SpO2 97%   General: Well developed, well nourished. No distress.    HENT: Head is grossly normal.  Pulmonary: Unlabored respiratory effort.   Neurologic: Grossly nonfocal. No facial asymmetry noted.  Musculoskeletal: No localized tenderness noted in the back.  Patient points to the " left low back increasing slightly into the inferior most aspect of the left flank.  No localized tenderness noted on the left hip or leg.  Strong left hip flexion/extension and left knee flexion/extension.  Strong plantar flexion and dorsiflexion of the left foot.  Strong left pedal pulse.  Skin: Warm, dry, good turgor. No rashes in visible areas.   Psych: Normal mood. Alert and oriented to person, place and time.    Assessment/Plan:  Stop cyclobenzaprine.  Will try tizanidine.  Will try gabapentin as well to see if this helps with the radiculopathy.  Follow-up with primary care.  May need to consider physical therapy or imaging.  Follow up for worsening or persistent symptoms.  1. Left flank pain  tizanidine (ZANAFLEX) 4 MG Tab   2. Lumbar radiculopathy  gabapentin (NEURONTIN) 100 MG Cap

## 2021-07-09 ENCOUNTER — OFFICE VISIT (OUTPATIENT)
Dept: URGENT CARE | Facility: PHYSICIAN GROUP | Age: 61
End: 2021-07-09
Payer: COMMERCIAL

## 2021-07-09 VITALS
WEIGHT: 160 LBS | RESPIRATION RATE: 12 BRPM | BODY MASS INDEX: 27.31 KG/M2 | DIASTOLIC BLOOD PRESSURE: 80 MMHG | TEMPERATURE: 98.4 F | SYSTOLIC BLOOD PRESSURE: 130 MMHG | OXYGEN SATURATION: 98 % | HEART RATE: 75 BPM | HEIGHT: 64 IN

## 2021-07-09 DIAGNOSIS — R10.9 ABDOMINAL DISCOMFORT: ICD-10-CM

## 2021-07-09 DIAGNOSIS — R10.11 RUQ PAIN: ICD-10-CM

## 2021-07-09 LAB
APPEARANCE UR: CLEAR
BILIRUB UR STRIP-MCNC: NORMAL MG/DL
COLOR UR AUTO: NORMAL
GLUCOSE UR STRIP.AUTO-MCNC: NORMAL MG/DL
KETONES UR STRIP.AUTO-MCNC: NORMAL MG/DL
LEUKOCYTE ESTERASE UR QL STRIP.AUTO: NORMAL
NITRITE UR QL STRIP.AUTO: NORMAL
PH UR STRIP.AUTO: 6 [PH] (ref 5–8)
PROT UR QL STRIP: NORMAL MG/DL
RBC UR QL AUTO: NORMAL
SP GR UR STRIP.AUTO: 1.02
UROBILINOGEN UR STRIP-MCNC: 0.2 MG/DL

## 2021-07-09 PROCEDURE — 81002 URINALYSIS NONAUTO W/O SCOPE: CPT | Performed by: PHYSICIAN ASSISTANT

## 2021-07-09 PROCEDURE — 99213 OFFICE O/P EST LOW 20 MIN: CPT | Performed by: PHYSICIAN ASSISTANT

## 2021-07-09 RX ORDER — OMEPRAZOLE 40 MG/1
CAPSULE, DELAYED RELEASE ORAL
COMMUNITY
Start: 2021-05-27

## 2021-07-09 RX ORDER — SERTRALINE HYDROCHLORIDE 100 MG/1
TABLET, FILM COATED ORAL
COMMUNITY
Start: 2021-05-30

## 2021-07-09 NOTE — PROGRESS NOTES
Chief Complaint   Patient presents with   • Back Pain     x 2 weeks   • Abdominal Pain       HISTORY OF PRESENT ILLNESS: Patient is a 61 y.o. female who presents today because she has a 2-week history of right upper quadrant abdominal pain that radiates to her right back.  She has had a gallbladder surgery years ago.  Her symptoms have been going on for about 2 weeks and have been getting gradually worse and now are quite severe.  She has tried ibuprofen for it and it did not help.  She denies any fevers, chills, nausea, vomiting or diarrhea, denies any stool changes.    Patient Active Problem List    Diagnosis Date Noted   • Chest pain 05/10/2015   • Foot pain 05/30/2013   • SOB (shortness of breath) 04/10/2013   • GERD (gastroesophageal reflux disease) 01/29/2013   • Fatty liver 01/29/2013   • Abdominal pain 01/03/2013   • DMII (diabetes mellitus, type 2) (HCC) 10/02/2012   • HTN (hypertension) 09/05/2012   • Anxiety 09/05/2012       Allergies:Vicodin [hydrocodone-acetaminophen]    Current Outpatient Medications Ordered in Epic   Medication Sig Dispense Refill   • sertraline (ZOLOFT) 100 MG Tab      • omeprazole (PRILOSEC) 40 MG delayed-release capsule      • metformin (GLUCOPHAGE) 1000 MG tablet      • gabapentin (NEURONTIN) 100 MG Cap Take 1 Cap by mouth 3 times a day as needed (back pain). START with one pill at bedtime, increase to 3 times daily as needed/as tolerated 30 Cap 0   • tizanidine (ZANAFLEX) 4 MG Tab Take 1 Tab by mouth every 6 hours as needed (pain). 30 Tab 0   • cyclobenzaprine (FLEXERIL) 10 MG Tab Take 1 Tab by mouth 3 times a day as needed. 30 Tab 0   • rosuvastatin (CRESTOR) 20 MG Tab Take 20 mg by mouth every evening.     • traZODone (DESYREL) 100 MG Tab Take 100 mg by mouth every evening.     • Insulin Degludec (TRESIBA SC) Inject  as instructed.     • metformin (GLUCOPHAGE) 500 MG TABS Take 1,000 mg by mouth 2 times a day, with meals.     • aspirin (ASA) 81 MG CHEW chewable tablet Take 81 mg  "by mouth every day.     • insulin detemir (LEVEMIR FLEXPEN) 100 UNIT/ML SOLN 80 units bid     • carvedilol (COREG) 25 MG TABS TAKE ONE TABLET BY MOUTH TWICE DAILY WITH  MEALS 180 Each 2   • sertraline (ZOLOFT) 50 MG TABS Take 1 Tab by mouth every day. (Patient taking differently: Take 100 mg by mouth every day.) 30 Tab 11     No current [facility]-ordered facility-administered medications on file.       Past Medical History:   Diagnosis Date   • Anxiety    • Arthritis    • Diabetes    • GERD (gastroesophageal reflux disease)    • Hyperlipidemia    • Hypertension        Social History     Tobacco Use   • Smoking status: Former Smoker     Packs/day: 2.00     Years: 30.00     Pack years: 60.00   • Smokeless tobacco: Never Used   Vaping Use   • Vaping Use: Never used   Substance Use Topics   • Alcohol use: No   • Drug use: No       Family Status   Relation Name Status   • Mo  Other   • Fa  Other   History reviewed. No pertinent family history.    ROS:  Review of Systems   Constitutional: Negative for fever, chills, weight loss and malaise/fatigue.   HENT: Negative for ear pain, nosebleeds, congestion, sore throat and neck pain.    Eyes: Negative for blurred vision.   Respiratory: Negative for cough, sputum production, shortness of breath and wheezing.    Cardiovascular: Negative for chest pain, palpitations, orthopnea and leg swelling.   Gastrointestinal: Negative for heartburn, nausea, vomiting and positive for abdominal pain.   Genitourinary: Negative for dysuria, urgency and frequency.     Exam:  /80 (BP Location: Left arm, Patient Position: Sitting, BP Cuff Size: Adult)   Pulse 75   Temp 36.9 °C (98.4 °F) (Temporal)   Resp 12   Ht 1.626 m (5' 4\")   Wt 72.6 kg (160 lb)   SpO2 98%   General:  Well nourished, well developed female in NAD  Head:Normocephalic, atraumatic  Eyes: PERRLA, EOM within normal limits, no conjunctival injection, no scleral icterus, visual fields and acuity grossly intact.  Nose: " Symmetrical without tenderness, no discharge.  Mouth: reasonable hygiene, no erythema exudates or tonsillar enlargement.  Neck: no masses, range of motion within normal limits, no tracheal deviation. No obvious thyroid enlargement.  Abdomen: Nondistended, bowel tones in all 4 quadrants, soft, she has exquisite tenderness in the right upper quadrant and to a lesser extent in the midepigastrium.  No organomegaly, rebound referred rebound tenderness.  Extremities: no clubbing, cyanosis, or edema.    Urinalysis unremarkable    Please note that this dictation was created using voice recognition software. I have made every reasonable attempt to correct obvious errors, but I expect that there are errors of grammar and possibly content that I did not discover before finalizing the note.    Assessment/Plan:  1. RUQ pain     2. Abdominal discomfort  POCT Urinalysis   Patient exquisitely tender in the right upper quadrant but has a history of gallbladder removal.  Recommended emergency room evaluation as I have no imaging at this facility.  Patient is here with her daughter who states she will take her directly to the emergency room.    Followup with primary care in the next 7-10 days if not significantly improving, return to the urgent care or go to the emergency room sooner for any worsening of symptoms.

## 2023-02-27 ENCOUNTER — OFFICE VISIT (OUTPATIENT)
Dept: URGENT CARE | Facility: PHYSICIAN GROUP | Age: 63
End: 2023-02-27
Payer: COMMERCIAL

## 2023-02-27 VITALS
HEART RATE: 68 BPM | SYSTOLIC BLOOD PRESSURE: 138 MMHG | BODY MASS INDEX: 34.15 KG/M2 | OXYGEN SATURATION: 98 % | TEMPERATURE: 97.2 F | HEIGHT: 64 IN | WEIGHT: 200 LBS | DIASTOLIC BLOOD PRESSURE: 80 MMHG | RESPIRATION RATE: 14 BRPM

## 2023-02-27 DIAGNOSIS — E11.65 UNCONTROLLED TYPE 2 DIABETES MELLITUS WITH HYPERGLYCEMIA (HCC): ICD-10-CM

## 2023-02-27 DIAGNOSIS — R11.2 NAUSEA AND VOMITING IN ADULT PATIENT: ICD-10-CM

## 2023-02-27 DIAGNOSIS — E11.43 DIABETES MELLITUS WITH GASTROPARESIS (HCC): ICD-10-CM

## 2023-02-27 LAB — GLUCOSE BLD-MCNC: 127 MG/DL (ref 65–99)

## 2023-02-27 PROCEDURE — 82962 GLUCOSE BLOOD TEST: CPT | Performed by: NURSE PRACTITIONER

## 2023-02-27 PROCEDURE — 99214 OFFICE O/P EST MOD 30 MIN: CPT | Performed by: NURSE PRACTITIONER

## 2023-02-27 RX ORDER — METOCLOPRAMIDE 5 MG/1
5 TABLET ORAL 3 TIMES DAILY PRN
Qty: 10 TABLET | Refills: 0 | Status: SHIPPED | OUTPATIENT
Start: 2023-02-27 | End: 2023-12-13

## 2023-02-27 ASSESSMENT — ENCOUNTER SYMPTOMS
VOMITING: 1
ABDOMINAL PAIN: 1
CONSTIPATION: 0
NAUSEA: 1
BLOOD IN STOOL: 0
CHILLS: 0
DIARRHEA: 1
HEADACHES: 1
DIZZINESS: 1
FEVER: 0

## 2023-02-27 NOTE — PROGRESS NOTES
Patient has consented to treatment and for use of patient information for treatment and billing purposes.    Chief Complaint:    Chief Complaint   Patient presents with    Nausea     X1 month Nausea, vomiting, diarrhea(1 week comes and goes)  , has been to her PCP, had xray's and MRI, not able to keep food down able to keep down some liquids, was given Zofran and did not work, headache          was used during this exam ID number [id number]    History of Present Illness: 62 y.o.  female presents to clinic with 1 month history of nausea, vomiting, diarrhea, and intermittent dizziness.  She reports every time she eats something she has to go to the restroom because she is so nauseated and it causes vomiting.   She reports that she has episodes of nausea and vomiting at least 7-8 times per day.  Dizziness with position changes  Diarrhea at least once daily most days of the week.  Abdominal pain only when she is nauseous and vomiting in the middle to right upper quadrant.  She denies any polyuria, polyphagia, polydipsia and then you had your gallbladder removed  Patient has history of cholecystectomy    Patient was seen by her primary care provider 2 weeks ago and was referred to the emergency department where she reports she had labs, urine, x-rays, MRIs/CT scans completed that were all within normal range.  She was not discharged home with any medications at that time    Patient is type II diabetic insulin-dependent takes metformin  Reports blood sugars have been running between 105-400.  Patient notified A1c level needs.  Currently taking 160 units levermir night and [place] 1000 mg twice a  Primary care provider is Dr. Nicole    Review of Systems   Constitutional:  Positive for malaise/fatigue. Negative for chills and fever.   Gastrointestinal:  Positive for abdominal pain, diarrhea, nausea and vomiting. Negative for blood in stool, constipation and melena.   Genitourinary:  Negative for dysuria,  frequency and urgency.   Skin:  Negative for rash.   Neurological:  Positive for dizziness and headaches.     Medications, Allergies, and current problem list reviewed today in Epic.    Physical Exam:    Vitals:    02/27/23 1528   BP: 138/80   Pulse: 68   Resp: 14   Temp: 36.2 °C (97.2 °F)   SpO2: 98%             Physical Exam  Constitutional:       General: She is not in acute distress.     Appearance: Normal appearance. She is not ill-appearing, toxic-appearing or diaphoretic.   HENT:      Head: Normocephalic.      Nose: Nose normal.      Mouth/Throat:      Mouth: Mucous membranes are moist.   Cardiovascular:      Rate and Rhythm: Normal rate and regular rhythm.   Pulmonary:      Effort: Pulmonary effort is normal.      Breath sounds: Normal breath sounds. No wheezing, rhonchi or rales.   Abdominal:      General: Abdomen is flat. Bowel sounds are decreased.      Palpations: Abdomen is soft.      Tenderness: There is generalized abdominal tenderness. There is no right CVA tenderness or left CVA tenderness.   Musculoskeletal:      Cervical back: Normal range of motion.   Neurological:      Mental Status: She is alert.           Medical Decision Making:  I personally reviewed multiple provider notes and test results pertinent to today's visit.   Shared decision-making was utilized with patient did develop treatment plan and clinic course.     Damaso, 62 y.o. female,  presents to clinic with 1 month history of nausea, vomiting, diarrhea, and intermittent dizziness.  From HPI sounds like patient has uncontrolled type 2 diabetes.  She denies any symptoms of polyuria, polyphagia, polydipsia.  She has not checked her blood sugars during episodes of nausea, vomiting, diarrhea, and dizziness.  She has been seen at the [place] emergency department at least twice over the past month with imaging, labs, and exams completed that did not show any significant abnormalities per patient report.  POC glucose in clinic was 124.   Patient denies any symptoms at this time.  She reports she is currently taking Levemir 160 units at night and metformin 1000 mg twice daily.  Discussed with patient that she may need to adjust her insulin levels and to follow-up with her primary care provider.  Recommended patient keep blood sugar log and presented to primary care provider at next office visit.  Patient's home blood sugars have been running between 105 and 400.    Discussed with patient at length that I believe her symptoms are due to uncontrolled type 2 diabetes.  Trial of Reglan was sent to pharmacy and patient was educated about risks and side effects to the medication.  Recommended patient drink 64 to 80 ounces of water per day, monitor carb intake, increase fiber, and start taking probiotic daily.  The patient remained stable during the urgent care visit.    Plan:    Medication discussed included indication for use and the potential  benefits and side effects.         1. Diabetes mellitus with gastroparesis (HCC)  - POCT Glucose  - Referral to establish with [facility] PCP  - Referral to Gastroenterology    2. Uncontrolled type 2 diabetes mellitus with hyperglycemia (HCC)  - POCT Glucose  - Referral to establish with [facility] PCP  - Referral to Gastroenterology    3. Nausea and vomiting in adult patient  - metoclopramide (REGLAN) 5 MG tablet; Take 1 Tablet by mouth 3 times a day as needed (nausea).  Dispense: 10 Tablet; Refill: 0      Verbal and/or printed education was provided regarding the assessment and diagnosis.  All of the patient's questions were answered to their satisfaction at the time of discharge.    Follow up:    Recommended f/u in  3-5 days if there is no improvement.    Patient was encouraged to monitor symptoms closely. Those signs and symptoms which would warrant concern and mandate seeking a higher level of service through the emergency department discussed at length and included in discharge papers.  Patient stated agreement and  understanding of this plan of care.    Disposition:  Home in stable condition       Voice Recognition Disclaimer:  Portions of this document were created using voice recognition software. The software does have a chance of producing errors of grammar and possibly content. I have made every reasonable attempt to correct obvious errors, but there may be errors of grammar and possibly content that I did not discover before finalizing the documentation.

## 2023-02-28 ENCOUNTER — TELEPHONE (OUTPATIENT)
Dept: HEALTH INFORMATION MANAGEMENT | Facility: OTHER | Age: 63
End: 2023-02-28
Payer: COMMERCIAL

## 2023-12-13 ENCOUNTER — OFFICE VISIT (OUTPATIENT)
Dept: URGENT CARE | Facility: PHYSICIAN GROUP | Age: 63
End: 2023-12-13
Payer: COMMERCIAL

## 2023-12-13 VITALS
OXYGEN SATURATION: 96 % | SYSTOLIC BLOOD PRESSURE: 100 MMHG | BODY MASS INDEX: 25.66 KG/M2 | HEIGHT: 65 IN | TEMPERATURE: 99.5 F | RESPIRATION RATE: 16 BRPM | HEART RATE: 101 BPM | DIASTOLIC BLOOD PRESSURE: 60 MMHG | WEIGHT: 154 LBS

## 2023-12-13 DIAGNOSIS — R00.0 TACHYCARDIA: ICD-10-CM

## 2023-12-13 DIAGNOSIS — R11.2 NAUSEA VOMITING AND DIARRHEA: ICD-10-CM

## 2023-12-13 DIAGNOSIS — Z79.4 TYPE 2 DIABETES MELLITUS WITH HYPERGLYCEMIA, WITH LONG-TERM CURRENT USE OF INSULIN (HCC): ICD-10-CM

## 2023-12-13 DIAGNOSIS — E11.65 TYPE 2 DIABETES MELLITUS WITH HYPERGLYCEMIA, WITH LONG-TERM CURRENT USE OF INSULIN (HCC): ICD-10-CM

## 2023-12-13 DIAGNOSIS — R05.1 ACUTE COUGH: ICD-10-CM

## 2023-12-13 DIAGNOSIS — U07.1 COVID-19: ICD-10-CM

## 2023-12-13 DIAGNOSIS — R19.7 NAUSEA VOMITING AND DIARRHEA: ICD-10-CM

## 2023-12-13 DIAGNOSIS — R52 GENERALIZED BODY ACHES: ICD-10-CM

## 2023-12-13 LAB
FLUAV RNA SPEC QL NAA+PROBE: NEGATIVE
FLUBV RNA SPEC QL NAA+PROBE: NEGATIVE
GLUCOSE BLD-MCNC: 118 MG/DL (ref 65–99)
RSV RNA SPEC QL NAA+PROBE: NEGATIVE
SARS-COV-2 RNA RESP QL NAA+PROBE: POSITIVE

## 2023-12-13 PROCEDURE — 0241U POCT CEPHEID COV-2, FLU A/B, RSV - PCR: CPT | Performed by: NURSE PRACTITIONER

## 2023-12-13 PROCEDURE — 3074F SYST BP LT 130 MM HG: CPT | Performed by: NURSE PRACTITIONER

## 2023-12-13 PROCEDURE — 3078F DIAST BP <80 MM HG: CPT | Performed by: NURSE PRACTITIONER

## 2023-12-13 PROCEDURE — 99214 OFFICE O/P EST MOD 30 MIN: CPT | Performed by: NURSE PRACTITIONER

## 2023-12-13 PROCEDURE — 82962 GLUCOSE BLOOD TEST: CPT | Performed by: NURSE PRACTITIONER

## 2023-12-13 RX ORDER — ONDANSETRON 4 MG/1
4 TABLET, ORALLY DISINTEGRATING ORAL ONCE
Status: COMPLETED | OUTPATIENT
Start: 2023-12-13 | End: 2023-12-13

## 2023-12-13 RX ORDER — INSULIN GLARGINE 300 U/ML
INJECTION, SOLUTION SUBCUTANEOUS
COMMUNITY
Start: 2023-11-30

## 2023-12-13 RX ORDER — LOSARTAN POTASSIUM AND HYDROCHLOROTHIAZIDE 12.5; 1 MG/1; MG/1
TABLET ORAL
COMMUNITY
Start: 2023-12-13

## 2023-12-13 RX ORDER — ONDANSETRON 4 MG/1
TABLET, ORALLY DISINTEGRATING ORAL
Qty: 15 TABLET | Refills: 0 | Status: SHIPPED | OUTPATIENT
Start: 2023-12-13

## 2023-12-13 RX ORDER — RIMEGEPANT SULFATE 75 MG/75MG
TABLET, ORALLY DISINTEGRATING ORAL
COMMUNITY
Start: 2023-12-04

## 2023-12-13 RX ADMIN — ONDANSETRON 4 MG: 4 TABLET, ORALLY DISINTEGRATING ORAL at 15:30

## 2023-12-13 NOTE — PROGRESS NOTES
"Subjective:   Chichi Ellis is a 63 y.o. female who presents for Vomiting (For a few days with headache. Coughing. Feels very cold. )    Patient is a 63-year-old female presenting clinic today with her daughter helping with translating.  Patient reports that for the past 2 weeks she has had nasal congestion, dry cough, chills, fatigue, headache, body aches, bilateral ear pain, sore throat, intermittent nausea with vomiting once to twice daily and diarrhea episodes 2-3 times per week.  She denies any abdominal pain, heart palpitations, chest pain, or known fevers.  Has been taking over-the-counter Coricidin with minimal symptom relief.  Patient is a type II diabetic and does take metformin and Tradjenta.  Patient's blood sugars are ranging between 400 and occasionally 60 in the morning.  Patient is actively nausea and vomiting in clinic.  Bile in color.    Medications, Allergies, and current problem list reviewed today in Epic.     Objective:     /60   Pulse (!) 101   Temp 37.5 °C (99.5 °F) (Temporal)   Resp 16   Ht 1.651 m (5' 5\")   Wt 69.9 kg (154 lb)   SpO2 96%     Physical Exam  Vitals reviewed.   Constitutional:       General: She is not in acute distress.     Appearance: Normal appearance. She is ill-appearing. She is not toxic-appearing or diaphoretic.   HENT:      Head: Normocephalic.      Right Ear: Tympanic membrane, ear canal and external ear normal.      Left Ear: Tympanic membrane, ear canal and external ear normal.      Nose: Rhinorrhea present. No congestion.      Mouth/Throat:      Mouth: Mucous membranes are moist.      Pharynx: No oropharyngeal exudate or posterior oropharyngeal erythema.   Eyes:      Extraocular Movements: Extraocular movements intact.      Conjunctiva/sclera: Conjunctivae normal.      Pupils: Pupils are equal, round, and reactive to light.   Cardiovascular:      Rate and Rhythm: Regular rhythm. Tachycardia present.   Pulmonary:      Effort: Pulmonary effort is normal. " No respiratory distress.      Breath sounds: Normal breath sounds. No wheezing, rhonchi or rales.      Comments: Dry cough  Abdominal:      General: Abdomen is flat. Bowel sounds are decreased. There is no distension.      Palpations: Abdomen is soft. There is no hepatomegaly or splenomegaly.      Tenderness: There is abdominal tenderness. There is guarding. There is no rebound. Positive signs include McBurney's sign. Negative signs include Sanchez's sign and Rovsing's sign.   Musculoskeletal:         General: Normal range of motion.      Cervical back: Normal range of motion and neck supple.   Lymphadenopathy:      Cervical: No cervical adenopathy.   Skin:     General: Skin is warm and dry.   Neurological:      Mental Status: She is alert and oriented to person, place, and time.   Psychiatric:         Mood and Affect: Mood normal.         Behavior: Behavior normal.         Thought Content: Thought content normal.         Judgment: Judgment normal.       Results for orders placed or performed in visit on 12/13/23   POCT CEPHEID COV-2, FLU A/B, RSV - PCR   Result Value Ref Range    SARS-CoV-2 by PCR Positive (A) Negative, Invalid    Influenza virus A RNA Negative Negative, Invalid    Influenza virus B, PCR Negative Negative, Invalid    RSV, PCR Negative Negative, Invalid   POCT Glucose   Result Value Ref Range    Glucose - Accu-Ck 118 (A) 65 - 99 mg/dL         Assessment/Plan:     Diagnosis and associated orders:     1. COVID-19        2. Acute cough  POCT CEPHEID COV-2, FLU A/B, RSV - PCR      3. Generalized body aches  POCT CEPHEID COV-2, FLU A/B, RSV - PCR      4. Nausea vomiting and diarrhea  ondansetron (ZOFRAN ODT) 4 MG TABLET DISPERSIBLE    POCT Glucose      5. Type 2 diabetes mellitus with hyperglycemia, with long-term current use of insulin ([facility])  POCT Glucose      6. Tachycardia           Comments/MDM:     Patient presents clinic ill-appearing.  She is tachycardic, mildly low blood pressure at 100/60.  She  is actively vomiting bile.  She is alert and oriented and speaking full sentences.  On exam, patient does have abdominal tenderness with guarding present worse in right lower quadrant, I am concerned for possible peritonitis versus appendicitis versus diverticulitis or possible obstruction.  Unfortunately she does need further workup at the [place] emergency department.  Discussed findings with patient and daughter and recommended that they go directly to the ER.   instructed patient to stay n.p.o.  Patient was administered Zofran prior to leaving.  She did leave in stable condition.  POCT blood sugar was 118.  1125: Patient positive for COVID.  Patient was called with results.  Currently in the emergency department being treated.             Please note that this dictation was created using voice recognition software. I have made a reasonable attempt to correct obvious errors, but I expect that there are errors of grammar and possibly content that I did not discover before finalizing the note.

## 2023-12-13 NOTE — PATIENT INSTRUCTIONS
Patient is a 63-year-old female presenting clinic today with her daughter helping with translating.  Patient reports that for the past 2 weeks she has had nasal congestion, dry cough, chills, fatigue, headache, body aches, bilateral ear pain, sore throat, intermittent nausea with vomiting once to twice daily and diarrhea episodes 2-3 times per week.  She denies any abdominal pain, heart palpitations, chest pain, or known fevers.  Has been taking over-the-counter Coricidin with minimal symptom relief.  Patient is a type II diabetic and does take metformin and Tradjenta.  Patient's blood sugars are ranging between 400 and occasionally 60 in the morning.  Patient is actively nausea and vomiting in clinic.  Bile in color.